# Patient Record
Sex: MALE
[De-identification: names, ages, dates, MRNs, and addresses within clinical notes are randomized per-mention and may not be internally consistent; named-entity substitution may affect disease eponyms.]

---

## 2017-03-19 NOTE — EDM.PDOC
ED HPI GENERAL MEDICAL PROBLEM





- General


Chief Complaint: Gastrointestinal Problem


Stated Complaint: VOMITING


Time Seen by Provider: 03/19/17 16:10





- History of Present Illness


INITIAL COMMENTS - FREE TEXT/NARRATIVE: 





HISTORY AND PHYSICAL:





History of present illness:


The patient is a 31-year-old male with no stated medical problems who presents 

with complaints of nausea vomiting and diarrhea that started at 10 AM. Patient 

states he does not have any abdominal pain fevers chills chest pain or 

shortness of breath and yesterday had a completely normal day. He woke this 

morning at his usual breakfast and went to work when this all started. He has 

not had any recent travel nor has he been any new foods. Bowel movements are 

watery but they're not black or bloody and this is vomiting everything that is 

putting in without coffee grounds or blood. Patient has no history of peptic 

ulcer disease and has not had anything to drink alcohol lives recently. He has 

no gallbladder pancreas or liver disease that he is aware of. The patient tried 

no over-the-counter meds before coming here





Review of systems: 


As per history of present illness and below otherwise all systems reviewed and 

negative.





Past medical history: 


As per history of present illness and as reviewed below otherwise 

noncontributory.





Surgical history: 


As per history of present illness and as reviewed below otherwise 

noncontributory.





Social history: 


No reported history of drug or alcohol abuse.





Family history: 


As per history of present illness and as reviewed below otherwise 

noncontributory.





Physical exam:


General: Well-developed well-nourished thin man who is nontoxic and speaking 

clearly and easily. His vital signs have been noted by me


HEENT: Atraumatic, normocephalic, pupils reactive, negative for conjunctival 

pallor or scleral icterus, mucous membranes tacky, throat clear, neck supple, 

nontender, trachea midline.


Lungs: Clear to auscultation, breath sounds equal bilaterally, chest nontender.


Heart: S1S2, regular, negative for clicks, rubs, or JVD.


Abdomen: Soft, nondistended, nontender. There is no tympany on percussion and 

bowel sounds are hyperactive Negative for masses or hepatosplenomegaly. 

Negative for costovertebral tenderness.


Genitourinary: Deferred.


Rectal: Deferred.


Extremities: Atraumatic, negative for cords or calf pain. Neurovascular 

unremarkable.


Neuro: Awake, alert, oriented. Cranial nerves II through XII unremarkable. 

Cerebellum unremarkable. Motor and sensory unremarkable throughout. Exam 

nonfocal.





Diagnostics:


CBC CMP amylase lipase UA stool for study





Therapeutics:


IV fluids Zofran Bentyl





Patient was unable to produce a stool for sample so I can send it off to the 

lab. He is feeling much better as of my reevaluation and is now taking by mouth 

fluids. I will give him a prescription for Zofran and Bentyl via [facility] for 

him to use and recommend bland diet and push hydration and follow up with 

family Dr.


Impression: 


Vomiting and diarrhea improving





Definitive disposition and diagnosis as appropriate pending reevaluation and 

review of above.





- Related Data


 Allergies











Allergy/AdvReac Type Severity Reaction Status Date / Time


 


azithromycin Allergy  Hives Verified 03/19/17 16:01











Home Meds: 


 Home Meds





. [No Known Home Meds]  04/03/15 [History]











Past Medical History


HEENT History: Reports: Other (see below)


Other HEENT History: strep


Cardiovascular History: Reports: None


Respiratory History: Reports: None


Gastrointestinal History: Reports: None


Genitourinary History: Reports: None


Neurological History: Reports: None


Psychiatric History: Reports: None


Endocrine/Metabolic History: Reports: None


Dermatologic History: Reports: None





- Infectious Disease History


Infectious Disease History: Reports: Chicken pox, Influenza





- Past Surgical History


HEENT Surgical History: Reports: None


Cardiovascular Surgical History: Reports: None


GI Surgical History: Reports: None





Social & Family History





- Tobacco Use


Smoking Status *Q: Current Every Day Smoker


Years of Tobacco use: 14


Packs/Tins Daily: 1





- Recreational Drug Use


Recreational Drug Use: No





ED ROS GENERAL





- Review of Systems


Review Of Systems: ROS reveals no pertinent complaints other than HPI.





ED EXAM, GENERAL





- Physical Exam


Exam: See Below (See dictation)





Course





- Vital Signs


Last Recorded V/S: 


 Last Vital Signs











Temp  36.6 C   03/19/17 16:02


 


Pulse  77   03/19/17 16:02


 


Resp  16   03/19/17 16:02


 


BP  166/93 H  03/19/17 16:02


 


Pulse Ox  98   03/19/17 16:02














- Orders/Labs/Meds


Orders: 


 Active Orders 24 hr











 Category Date Time Status


 


 CULTURE STOOL + CAMPY+SHIGATOX [RM] Stat Lab  03/19/17 16:17 Uncollected


 


 Sodium Chloride 0.9% [Saline Flush] Med  03/19/17 16:17 Active





 10 ml FLUSH ASDIRECTED PRN   


 


 Sodium Chloride 0.9% [Saline Flush] Med  03/19/17 16:17 Active





 2.5 ml FLUSH ASDIRECTED PRN   


 


 Saline Lock Insert [OM.PC] Stat Oth  03/19/17 16:15 Ordered








 Medication Orders





Sodium Chloride (Saline Flush)  10 ml FLUSH ASDIRECTED PRN


   PRN Reason: Keep Vein Open


Sodium Chloride (Saline Flush)  2.5 ml FLUSH ASDIRECTED PRN


   PRN Reason: Keep Vein Open








Labs: 


 Laboratory Tests











  03/19/17 03/19/17 03/19/17 Range/Units





  16:39 16:39 17:15 


 


WBC  11.01 H    (4.0-11.0)  K/uL


 


RBC  5.26    (4.50-5.90)  M/uL


 


Hgb  16.4    (13.0-17.0)  g/dL


 


Hct  47.4    (38.0-50.0)  %


 


MCV  90.1    (80.0-98.0)  fL


 


MCH  31.2    (27.0-32.0)  pg


 


MCHC  34.6    (31.0-37.0)  g/dL


 


RDW Std Deviation  43.5    (28.0-62.0)  fl


 


RDW Coeff of Ben  13    (11.0-15.0)  %


 


Plt Count  217    (150-400)  K/uL


 


MPV  10.00    (7.40-12.00)  fL


 


Neut % (Auto)  59.2    (48.0-80.0)  %


 


Lymph % (Auto)  29.3    (16.0-40.0)  %


 


Mono % (Auto)  7.2    (0.0-15.0)  %


 


Eos % (Auto)  3.5    (0.0-7.0)  %


 


Baso % (Auto)  0.8    (0.0-1.5)  %


 


Neut #  6.5 H    (1.4-5.7)  K/uL


 


Lymph #  3.2 H    (0.6-2.4)  K/uL


 


Mono #  0.8    (0.0-0.8)  K/uL


 


Eos #  0.4    (0.0-0.7)  K/uL


 


Baso #  0.1    (0.0-0.1)  K/uL


 


Nucleated RBC %  0.0    /100WBC


 


Nucleated RBCs #  0    K/uL


 


Sodium   140   (136-146)  mmol/L


 


Potassium   3.7   (3.5-5.1)  mmol/L


 


Chloride   107   ()  mmol/L


 


Carbon Dioxide   23   (21-31)  mmol/L


 


BUN   13   (6.0-23.0)  mg/dL


 


Creatinine   1.3   (0.6-1.5)  mg/dL


 


Est Cr Clr Drug Dosing   94.75   mL/min


 


Estimated GFR (MDRD)   > 60.0   ml/min


 


Glucose   109   ()  mg/dL


 


Calcium   8.7 L   (8.8-10.8)  mg/dL


 


Total Bilirubin   0.7   (0.1-1.5)  mg/dL


 


AST   19   (5-40)  IU/L


 


ALT   17   (8-54)  IU/L


 


Alkaline Phosphatase   67   ()  


 


Total Protein   6.9   (6.0-8.0)  g/dL


 


Albumin   4.2   (3.5-5.0)  g/dL


 


Globulin   2.7   (2.0-3.5)  g/dL


 


Albumin/Globulin Ratio   1.6   (1.3-2.8)  


 


Amylase   73   (10-90)  U/L


 


Lipase   26   (7-80)  U/L


 


Urine Color    YELLOW  


 


Urine Appearance    CLEAR  


 


Urine pH    6.5  (5.0-8.0)  


 


Ur Specific Gravity    1.010  (1.001-1.035)  


 


Urine Protein    NEGATIVE  (NEGATIVE)  mg/dL


 


Urine Glucose (UA)    NEGATIVE  (NEGATIVE)  mg/dL


 


Urine Ketones    NEGATIVE  (NEGATIVE)  mg/dL


 


Urine Occult Blood    NEGATIVE  (NEGATIVE)  


 


Urine Nitrite    NEGATIVE  (NEGATIVE)  


 


Urine Bilirubin    NEGATIVE  (NEGATIVE)  


 


Urine Urobilinogen    0.2  (<2.0)  EU/dL


 


Ur Leukocyte Esterase    NEGATIVE  (NEGATIVE)  


 


Urine RBC    0-1  (0-2/HPF)  


 


Urine WBC    0-2  (0-5/HPF)  


 


Ur Epithelial Cells    RARE  (NONE-FEW)  


 


Urine Bacteria    RARE  (NEGATIVE)  











Meds: 


Medications











Generic Name Dose Route Start Last Admin





  Trade Name Freq  PRN Reason Stop Dose Admin


 


Sodium Chloride  10 ml  03/19/17 16:17  





  Saline Flush  FLUSH   





  ASDIRECTED PRN   





  Keep Vein Open   


 


Sodium Chloride  2.5 ml  03/19/17 16:17  





  Saline Flush  FLUSH   





  ASDIRECTED PRN   





  Keep Vein Open   














Discontinued Medications














Generic Name Dose Route Start Last Admin





  Trade Name Freq  PRN Reason Stop Dose Admin


 


Dicyclomine HCl  10 mg  03/19/17 17:22  





  Bentyl  PO  03/19/17 17:23  





  ONETIME ONE   


 


Sodium Chloride  1,000 mls @ 999 mls/hr  03/19/17 16:17  03/19/17 16:29





  Normal Saline  IV  03/19/17 17:17  999 mls/hr





  STAT ONE   Administration


 


Ondansetron HCl  4 mg  03/19/17 16:17  03/19/17 16:29





  Zofran  IVPUSH  03/19/17 16:18  4 mg





  ONETIME ONE   Administration














Departure





- Departure


Time of Disposition: 17:39


Disposition: Home, Self-Care 01


Condition: good


Clinical Impression: 


 Vomiting, Diarrhea





Forms:  ED Department Discharge


Additional Instructions: 


The following information is given to patients seen in the emergency department 

who are being discharged to home. This information is to outline your options 

for follow-up care. We provide all patients seen in our emergency department 

with a follow-up referral.





The need for follow-up, as well as the timing and circumstances, are variable 

depending upon the specifics of your emergency department visit.





If you don't have a primary care physician on staff, we will provide you with a 

referral. We always advise you to contact your personal physician following an 

emergency department visit to inform them of the circumstance of the visit and 

for follow-up with them and/or the need for any referrals to a consulting 

specialist.





The emergency department will also refer you to a specialist when appropriate. 

This referral assures that you have the opportunity for followup care with a 

specialist. All of these measure are taken in an effort to provide you with 

optimal care, which includes your followup.





Under all circumstances we always encourage you to contact your private 

physician who remains a resource for coordinating  your care. When calling for 

followup care, please make the office aware that this follow-up is from your 

recent emergency room visit. If for any reason you are refused follow-up, 

please contact the [facility] emergency 

department at [phone] and ask to speak to the emergency department 

charge nurse.





[facility] 


Primary care- Internal Medicine and Family [address] [address]


[phone]








Push hydration such as water and Gatorade or juice and avoid caffeinated 

products. He bland diet the next 24 hours and use medications, Zofran and 

dicyclomine, as needed for nausea vomiting and cramping. You can also use over-

the-counter Imodium or other antidiarrheal medications as you choose. Please 

return to ER as needed and as discussed in please call and followup with 

primary care





- My Orders


Last 24 Hours: 


My Active Orders





03/19/17 16:15


Saline Lock Insert [OM.PC] Stat 





03/19/17 16:17


CULTURE STOOL + CAMPY+SHIGATOX [RM] Stat 


Sodium Chloride 0.9% [Saline Flush]   10 ml FLUSH ASDIRECTED PRN 


Sodium Chloride 0.9% [Saline Flush]   2.5 ml FLUSH ASDIRECTED PRN 














- Assessment/Plan


Last 24 Hours: 


My Active Orders





03/19/17 16:15


Saline Lock Insert [OM.PC] Stat 





03/19/17 16:17


CULTURE STOOL + CAMPY+SHIGATOX [RM] Stat 


Sodium Chloride 0.9% [Saline Flush]   10 ml FLUSH ASDIRECTED PRN 


Sodium Chloride 0.9% [Saline Flush]   2.5 ml FLUSH ASDIRECTED PRN

## 2017-06-03 NOTE — EDM.PDOC
ED HPI GENERAL MEDICAL PROBLEM





- General


Chief Complaint: Back Pain or Injury


Stated Complaint: BACK PAIN


Time Seen by Provider: 06/03/17 15:30


Source of Information: Reports: Patient


History Limitations: Reports: No Limitations





- History of Present Illness


INITIAL COMMENTS - FREE TEXT/NARRATIVE: 





Presents to the ER reporting low back pain. The patient does not recall a 

particular injury but a couple of days ago he "twisted wrong" when he got into 

his truck. He thinks that may have initiated the problem. He denies tingling or 

numbness to the buttocks, thighs, lower legs or feet. Denies nausea, hematuria, 

personal or family history of kidney stones, saddle anesthesia, cancer history, 

bowel or bladder dysfunction or recent foreign travel.  He characterizes the 

pain as a tightness across the low back. He states sitting in the truck and 

getting out of bed make the pain worse. He has tried icy hot, ibuprofen, 

Tylenol with out palliation.  He has not had trouble sleeping at night.


  ** Lower Back


Pain Score (Numeric/FACES): 5





- Related Data


 Allergies











Allergy/AdvReac Type Severity Reaction Status Date / Time


 


azithromycin Allergy  Hives Verified 03/19/17 16:01











Home Meds: 


 Home Meds





. [No Known Home Meds]  04/03/15 [History]











Past Medical History





- Past Health History


Medical/Surgical History: Denies Medical/Surgical History


HEENT History: Reports: Other (See Below)


Other HEENT History: strep


Cardiovascular History: Reports: None


Respiratory History: Reports: None


Gastrointestinal History: Reports: None


Genitourinary History: Reports: None


Neurological History: Reports: None


Psychiatric History: Reports: None


Endocrine/Metabolic History: Reports: None


Dermatologic History: Reports: None





- Infectious Disease History


Infectious Disease History: Reports: Chicken Pox, Scarlet Fever





- Past Surgical History


Cardiovascular Surgical History: Reports: None


GI Surgical History: Reports: None





Social & Family History





- Family History


Family Medical History: Noncontributory





- Tobacco Use


Smoking Status *Q: Current Every Day Smoker


Years of Tobacco use: 15


Packs/Tins Daily: 0.3





- Caffeine Use


Caffeine Use: Reports: Energy Drinks, Tea





- Recreational Drug Use


Recreational Drug Use: No





ED ROS GENERAL





- Review of Systems


Review Of Systems: ROS reveals no pertinent complaints other than HPI.





ED EXAM,LOWER BACK PAIN/INJURY





- Physical Exam


Exam: See Below


Exam Limited By: No Limitations


General Appearance: Alert, No Apparent Distress


Ears: Normal External Exam


Nose: Normal Inspection


Throat/Mouth: Normal Inspection


Head: Atraumatic, Normocephalic


Neck: Normal Inspection


Respiratory/Chest: No Respiratory Distress, Lungs Clear, Normal Breath Sounds


Cardiovascular: Normal Peripheral Pulses, Regular Rate, Rhythm, No Murmur


GI/Abdominal: Soft, No Distention


Back Exam: Normal Inspection, Full Range of Motion.  No: CVA Tenderness (L), 

CVA Tenderness (R), Paraspinal Tenderness, Vertebral Tenderness


Extremities: Normal Inspection


Neurological: Alert, Normal Mood/Affect, No Motor/Sensory Deficits, Oriented x 3


Psychiatric: Normal Affect, Normal Mood


Skin Exam: Warm, Dry, Intact, Normal Color, No Rash





Course





- Vital Signs


Last Recorded V/S: 





 Last Vital Signs











Temp  37.1 C   06/03/17 15:37


 


Pulse  75   06/03/17 15:37


 


Resp  16   06/03/17 15:37


 


BP  144/85 H  06/03/17 15:37


 


Pulse Ox  97   06/03/17 15:37














- Orders/Labs/Meds


Orders: 





 Active Orders 24 hr











 Category Date Time Status


 


 Ketorolac [Toradol] Med  06/03/17 15:48 Once





 60 mg IM ONETIME ONE   


 


 Orphenadrine [Norflex] Med  06/03/17 16:00 Once





 60 mg IM Q12H ONE   














Departure





- Departure


Time of Disposition: 15:54


Disposition: Home, Self-Care 01


Condition: good


Clinical Impression: 


Lumbar strain


Qualifiers:


 Encounter type: initial encounter Qualified Code(s): S39.012A - Strain of 

muscle, fascia and tendon of lower back, initial encounter








- Discharge Information


Referrals: 


PCP,None [Primary Care Provider] - 


[facility] [Outside]


[facility] [Outside]


Forms:  ED Department Discharge


Additional Instructions: 


1.  Physical therapy evaluate and treat, see prescription for address and phone 

number to make appointment


2.  Flexeril muscle relaxant every 8 hours as needed, no driving or operating 

machinery


3.  Diclofenac every 8 hours for the next 2-3 days and then as needed for anti-

inflammatory and pain


4.  Hot or cold packs whichever feels best 20 minutes every 3-4 hours











- My Orders


Last 24 Hours: 





My Active Orders





06/03/17 15:48


Ketorolac [Toradol]   60 mg IM ONETIME ONE 





06/03/17 16:00


Orphenadrine [Norflex]   60 mg IM Q12H ONE 














- Assessment/Plan


Last 24 Hours: 





My Active Orders





06/03/17 15:48


Ketorolac [Toradol]   60 mg IM ONETIME ONE 





06/03/17 16:00


Orphenadrine [Norflex]   60 mg IM Q12H ONE

## 2017-08-27 NOTE — EDM.PDOC
ED HPI GENERAL MEDICAL PROBLEM





- General


Chief Complaint: Lower Extremity Injury/Pain


Stated Complaint: CHEM BURNS ON FOOT


Time Seen by Provider: 08/27/17 19:30


Source of Information: Reports: Patient


History Limitations: Reports: No Limitations





- History of Present Illness


INITIAL COMMENTS - FREE TEXT/NARRATIVE: 


HISTORY AND PHYSICAL:





History of present illness:


[Patient comes to the emergency room complaining of a chemical burn to the top 

of his left foot. 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 

found that the pods do not completely disintegrate in the wash and are 

sometimes found mixed in his clothing. He noticed some irritation to the top of 

his left foot in his work boot while he was working a couple of days ago. When 

he got home and took off his sock he noticed a soapy appearance to the top of 

his foot with some scabby lesions. He didn't work for a couple of days and 

returned to work today. His foot feels more irritated today and he has noticed 

redness and swelling across the top of his foot. After work this evening his 

wound was wet and he thinks he saw some greenish discharge from the wound.


He denies fever and chills, no chest pain, shortness of breath or difficulty 

breathing. No abdominal pain nausea vomiting.





Review of systems: 


As per history of present illness and below otherwise all systems reviewed and 

negative.





Past medical history: 


As per history of present illness and as reviewed below otherwise 

noncontributory.





Surgical history: 


As per history of present illness and as reviewed below otherwise 

noncontributory.





Social history: 


No reported history of drug or alcohol abuse.





Family history: 


As per history of present illness and as reviewed below otherwise 

noncontributory.





Physical exam:


HEENT: Atraumatic, normocephalic.


Lungs: Clear to auscultation, breath sounds equal bilaterally.


Heart: S1S2, regular rate and rhythm.


Abdomen: Soft, nondistended, nontender. 


Pelvis: Stable nontender.


Genitourinary: Deferred.


Rectal: Deferred.


Extremities: Quarter-sized burn to top of left mid footwith surrounding 

erythema. No discharge noted. Generalized mild swelling to left foot. No 

tenderness to the plantar aspect. No pitting edema negative for cords or calf 

pain. Neurovascular unremarkable.


Neuro: Awake, alert, oriented. . Motor and sensory unremarkable throughout. 

Exam nonfocal.





Diagnostics:


[cbc]





Therapeutics:


[Keflex 500 mg by mouth 1]





Impression: 


Burn top of left foot[]





Plan:


[Rx for cephalexin 500 mg #30 sig 1 by mouth 3 times a day 0 refills. 

Instructed patient to apply bacitracin and clean bandage twice a day and follow-

up with primary care in the next couple of days. He is in agreement with today'

s plan. All his questions are answered and concerns are addressed.]





Definitive disposition and diagnosis as appropriate pending reevaluation and 

review of above.








  ** left foot


Pain Score (Numeric/FACES): 3





- Related Data


 Allergies











Allergy/AdvReac Type Severity Reaction Status Date / Time


 


azithromycin Allergy  Hives Verified 08/27/17 19:15











Home Meds: 


 Home Meds





. [No Known Home Meds]  08/27/17 [History]











Past Medical History





- Past Health History


Medical/Surgical History: Denies Medical/Surgical History


HEENT History: Reports: Other (See Below)


Other HEENT History: strep


Cardiovascular History: Reports: None


Respiratory History: Reports: None


Gastrointestinal History: Reports: None


Genitourinary History: Reports: None


Neurological History: Reports: None


Psychiatric History: Reports: None


Endocrine/Metabolic History: Reports: None


Dermatologic History: Reports: None





- Infectious Disease History


Infectious Disease History: Reports: Chicken Pox, Scarlet Fever





- Past Surgical History


Cardiovascular Surgical History: Reports: None


GI Surgical History: Reports: None





Social & Family History





- Family History


Family Medical History: Noncontributory





- Tobacco Use


Smoking Status *Q: Current Every Day Smoker


Years of Tobacco use: 14


Packs/Tins Daily: 1





- Caffeine Use


Caffeine Use: Reports: Energy Drinks, Tea





- Recreational Drug Use


Recreational Drug Use: No





Review of Systems





- Review of Systems


Review Of Systems: ROS reveals no pertinent complaints other than HPI.





ED EXAM, GENERAL





- Physical Exam


Exam: See Below





Course





- Vital Signs


Last Recorded V/S: 


 Last Vital Signs











Temp  97.7 F   08/27/17 19:15


 


Pulse  95   08/27/17 19:15


 


Resp  14   08/27/17 19:15


 


BP  141/93 H  08/27/17 19:15


 


Pulse Ox  97   08/27/17 19:15














- Orders/Labs/Meds


Labs: 


 Laboratory Tests











  08/27/17 Range/Units





  19:46 


 


WBC  10.45  (4.0-11.0)  K/uL


 


RBC  5.03  (4.50-5.90)  M/uL


 


Hgb  16.0  (13.0-17.0)  g/dL


 


Hct  45.7  (38.0-50.0)  %


 


MCV  90.9  (80.0-98.0)  fL


 


MCH  31.8  (27.0-32.0)  pg


 


MCHC  35.0  (31.0-37.0)  g/dL


 


RDW Std Deviation  42.7  (28.0-62.0)  fl


 


RDW Coeff of Ben  13  (11.0-15.0)  %


 


Plt Count  219  (150-400)  K/uL


 


MPV  10.10  (7.40-12.00)  fL


 


Neut % (Auto)  55.1  (48.0-80.0)  %


 


Lymph % (Auto)  30.6  (16.0-40.0)  %


 


Mono % (Auto)  7.6  (0.0-15.0)  %


 


Eos % (Auto)  6.1  (0.0-7.0)  %


 


Baso % (Auto)  0.6  (0.0-1.5)  %


 


Neut # (Auto)  5.8 H  (1.4-5.7)  K/uL


 


Lymph # (Auto)  3.2 H  (0.6-2.4)  K/uL


 


Mono # (Auto)  0.8  (0.0-0.8)  K/uL


 


Eos # (Auto)  0.6  (0.0-0.7)  K/uL


 


Baso # (Auto)  0.1  (0.0-0.1)  K/uL


 


Nucleated RBC %  0.0  /100WBC


 


Nucleated RBCs #  0  K/uL











Meds: 


Medications














Discontinued Medications














Generic Name Dose Route Start Last Admin





  Trade Name Freq  PRN Reason Stop Dose Admin


 


Bacitracin  1 dose  08/27/17 20:41  08/27/17 20:48





  Bacitracin Oint 1 Gm  TOP  08/27/17 20:42  1 dose





  ONETIME ONE   Administration


 


Cephalexin  500 mg  08/27/17 20:48  





  Keflex  PO  08/27/17 20:49  





  ONETIME ONE   














Departure





- Departure


Time of Disposition: 20:50


Disposition: Home, Self-Care 01


Condition: Good


Clinical Impression: 


 Burn








- Discharge Information


Referrals: 


PCP,None [Primary Care Provider] - 


Forms:  ED Department Discharge


Additional Instructions: 


The following information is given to patients seen in the emergency department 

who are being discharged to home. This information is to outline your options 

for follow-up care. We provide all patients seen in our emergency department 

with a follow-up referral.





The need for follow-up, as well as the timing and circumstances, are variable 

depending upon the specifics of your emergency department visit.





If you don't have a primary care physician on staff, we will provide you with a 

referral. We always advise you to contact your personal physician following an 

emergency department visit to inform them of the circumstance of the visit and 

for follow-up with them and/or the need for any referrals to a consulting 

specialist.





The emergency department will also refer you to a specialist when appropriate. 

This referral assures that you have the opportunity for follow-up care with a 

specialist. All of these measure are taken in an effort to provide you with 

optimal care, which includes your follow-up.





Under all circumstances we always encourage you to contact your private 

physician who remains a resource for coordinating your care. When calling for 

follow-up care, please make the office aware that this follow-up is from your 

recent emergency room visit. If for any reason you are refused follow-up, 

please contact the [facility]  emergency 

department at [phone] and asked to speak to the emergency department 

charge nurse.








[facility]


Primary Care


[address] [place] [id number]


Phone: [phone]


Fax: [phone]








Follow-up with your primary care provider or the clinic listed above in 48-72 

hours.


Keep burns clean and dry. Apply bacitracin or Neosporin ointment to the 

affected area twice daily and change the dressing.


Tylenol or ibuprofen as needed for discomfort.


Return to ER as needed as discussed.

## 2018-04-02 NOTE — EDM.PDOC
ED HPI GENERAL MEDICAL PROBLEM





- General


Chief Complaint: Abdominal Pain


Stated Complaint: STOMACHE ACHE


Time Seen by Provider: 04/02/18 22:57





- History of Present Illness


INITIAL COMMENTS - FREE TEXT/NARRATIVE: 





HISTORY AND PHYSICAL:





History of present illness:


The patient is a 32-year-old male who presents with a 4-5 day history of 

epigastric pain which is not associated with chest pain shortness of breath 

fever chills vomiting or diarrhea. The patient says that he has had issues with 

his teeth, more specifically a wisdom tooth on the right upper jaw area, for 

which she has been taking a lot of Motrin based products and in the past when 

he has done this he has had stomach pain. He says this feels somewhat 

similarly. The pain is localized in the epigastric area and does not radiate 

right or left and has no lower abdominal pain and no radiation to the back. The 

patient has not had black or bloody stools and he has not taken any over-the-

counter medications for his stomach pain. He says that the pain does seem to be 

better at nighttime and he has not had much of an appetite but he is eating. He 

says foods do not make the pain worse and in fact he thinks it might make it 

better. The patient has only a abdominal surgical history of inguinal hernia 

repair and has not had any other GI problems for which he has seen her provider 

for. I have discussed with the patient his caffeine use and he states he does 

drink 2 Monster drinks and he does smoke cigarettes and does not eat 

specifically a healthy diet.





Review of systems: 


As per history of present illness and below otherwise all systems reviewed and 

negative.





Past medical history: 


As per history of present illness and as reviewed below otherwise 

noncontributory.





Surgical history: 


As per history of present illness and as reviewed below otherwise 

noncontributory.





Social history: 


No reported history of drug or alcohol abuse.





Family history: 


As per history of present illness and as reviewed below otherwise 

noncontributory.





Physical exam:


General: Well-developed well-nourished man is nontoxic and vital signs have 

been reviewed by me


HEENT: Atraumatic, normocephalic, pupils reactive, negative for conjunctival 

pallor or scleral icterus, mucous membranes moist, throat clear, neck supple, 

nontender, trachea midline. On inspection of the dentition the patient has 

multiple areas of dental disease and dental decay but the area that he is most 

concerned about at the right upper wisdom tooth area does have some gum 

erythema without fluctuance and there is no facial swelling.


Lungs: Clear to auscultation, breath sounds equal bilaterally, chest nontender.


Heart: S1S2, regular, and rhythm no overt murmurs


Abdomen: Soft, nondistended, mild epigastric discomfort on deep palpation 

without rebound or guarding, bowel sounds are slightly hypoactive Negative for 

masses or hepatosplenomegaly. Negative for costovertebral tenderness.


Pelvis: Stable nontender.


Genitourinary: Deferred.


Rectal: Deferred.


Extremities: Atraumatic, negative for cords or calf pain. Neurovascular 

unremarkable.


Neuro: Awake, alert, oriented. Cranial nerves II through XII unremarkable. 

Cerebellum unremarkable. Motor and sensory unremarkable throughout. Exam 

nonfocal.





Diagnostics:


CBC CMP H. pylori amylase lipase abdominal x-ray





Therapeutics:


GI cocktail, dental balls for home





I discussed with the patient that we would give him dental balls to try for 

pain management so that he could stop using over-the-counter NSAIDs as this may 

be triggering some of his discomfort. I've also discussed with them reducing and

/or eliminate caffeine use avoiding spicy foods fatty foods and junk foods and 

putting him on a trial of PPIs. If stressed the need for follow-up in the 

clinic as he may need endoscopy to further clarify the etiology of his pain.





Impression: 


Epigastric pain





Definitive disposition and diagnosis as appropriate pending reevaluation and 

review of above.


  ** Gastric area


Pain Score (Numeric/FACES): 4





- Related Data


 Allergies











Allergy/AdvReac Type Severity Reaction Status Date / Time


 


azithromycin Allergy  Hives Verified 04/02/18 22:50











Home Meds: 


 Home Meds





. [No Known Home Meds]  08/27/17 [History]











Past Medical History





- Past Health History


Medical/Surgical History: Denies Medical/Surgical History


HEENT History: Reports: Other (See Below)


Other HEENT History: strep


Cardiovascular History: Reports: None


Respiratory History: Reports: None


Gastrointestinal History: Reports: None


Genitourinary History: Reports: None


Neurological History: Reports: None


Psychiatric History: Reports: None


Endocrine/Metabolic History: Reports: None


Dermatologic History: Reports: None





- Infectious Disease History


Infectious Disease History: Reports: Chicken Pox, Scarlet Fever





- Past Surgical History


Cardiovascular Surgical History: Reports: None


GI Surgical History: Reports: Hernia, Inguinal





Social & Family History





- Family History


Family Medical History: Noncontributory





- Tobacco Use


Smoking Status *Q: Current Every Day Smoker


Years of Tobacco use: 15


Packs/Tins Daily: 0.3





- Caffeine Use


Caffeine Use: Reports: Energy Drinks, Tea





- Recreational Drug Use


Recreational Drug Use: No





ED ROS GENERAL





- Review of Systems


Review Of Systems: ROS reveals no pertinent complaints other than HPI.





ED EXAM, GENERAL





- Physical Exam


Exam: See Below (See dictation)





Course





- Vital Signs


Last Recorded V/S: 


 Last Vital Signs











Temp  37.0 C   04/02/18 22:37


 


Pulse  79   04/02/18 22:37


 


Resp  18   04/02/18 22:37


 


BP  174/104 H  04/02/18 22:37


 


Pulse Ox  96   04/02/18 22:37














- Orders/Labs/Meds


Orders: 


 Active Orders 24 hr











 Category Date Time Status


 


 Abdomen 1V Upright [CR] Stat Exams  04/02/18 22:58 Taken











Labs: 


 Laboratory Tests











  04/02/18 04/02/18 04/02/18 Range/Units





  23:05 23:05 23:05 


 


WBC  13.29 H    (4.0-11.0)  K/uL


 


RBC  5.44    (4.50-5.90)  M/uL


 


Hgb  17.6 H    (13.0-17.0)  g/dL


 


Hct  48.3    (38.0-50.0)  %


 


MCV  88.8    (80.0-98.0)  fL


 


MCH  32.4 H    (27.0-32.0)  pg


 


MCHC  36.4    (31.0-37.0)  g/dL


 


RDW Std Deviation  42.1    (28.0-62.0)  fl


 


RDW Coeff of Ben  13    (11.0-15.0)  %


 


Plt Count  236    (150-400)  K/uL


 


MPV  9.80    (7.40-12.00)  fL


 


Neut % (Auto)  60.7    (48.0-80.0)  %


 


Lymph % (Auto)  28.7    (16.0-40.0)  %


 


Mono % (Auto)  5.9    (0.0-15.0)  %


 


Eos % (Auto)  4.2    (0.0-7.0)  %


 


Baso % (Auto)  0.5    (0.0-1.5)  %


 


Neut # (Auto)  8.1 H    (1.4-5.7)  K/uL


 


Lymph # (Auto)  3.8 H    (0.6-2.4)  K/uL


 


Mono # (Auto)  0.8    (0.0-0.8)  K/uL


 


Eos # (Auto)  0.6    (0.0-0.7)  K/uL


 


Baso # (Auto)  0.1    (0.0-0.1)  K/uL


 


Nucleated RBC %  0.0    /100WBC


 


Nucleated RBCs #  0    K/uL


 


Sodium   137   (136-148)  mmol/L


 


Potassium   3.3 L   (3.5-5.1)  mmol/L


 


Chloride   102   ()  mmol/L


 


Carbon Dioxide   23.9   (21.0-32.0)  mmol/L


 


BUN   11   (7.0-18.0)  mg/dL


 


Creatinine   1.1   (0.8-1.3)  mg/dL


 


Est Cr Clr Drug Dosing   110.97   mL/min


 


Estimated GFR (MDRD)   > 60.0   ml/min


 


Glucose   105   ()  mg/dL


 


Calcium   8.9   (8.5-10.1)  mg/dL


 


Total Bilirubin   0.4   (0.2-1.0)  mg/dL


 


AST   14 L   (15-37)  IU/L


 


ALT   21   (14-63)  IU/L


 


Alkaline Phosphatase   88   ()  U/L


 


Total Protein   7.3   (6.4-8.2)  g/dL


 


Albumin   4.2   (3.4-5.0)  g/dL


 


Globulin   3.1   (2.0-3.5)  g/dL


 


Albumin/Globulin Ratio   1.4   (1.3-2.8)  


 


Amylase   66   ()  U/L


 


Lipase   126   ()  U/L


 


H. pylori IgG Antibody    NEGATIVE  (NEG)  











Meds: 


Medications














Discontinued Medications














Generic Name Dose Route Start Last Admin





  Trade Name Freq  PRN Reason Stop Dose Admin


 


Benzocaine  2 each  04/02/18 22:58  





  Hurricaine One 20%  MUCMEM  04/02/18 22:59  





  ONETIME ONE   





     





     





     





     


 


Lidocaine HCl  15 ml  04/02/18 22:58  





  Xylocaine 2% Viscous  PO  04/02/18 22:59  





  ONETIME ONE   





     





     





     





     














Departure





- Departure


Time of Disposition: 23:43


Disposition: Home, Self-Care 01


Condition: Good


Clinical Impression: 


 Epigastric pain








- Discharge Information


Referrals: 


PCP,None [Primary Care Provider] - 


Forms:  ED Department Discharge


Additional Instructions: 


The following information is given to patients seen in the emergency department 

who are being discharged to home. This information is to outline your options 

for follow-up care. We provide all patients seen in our emergency department 

with a follow-up referral.





The need for follow-up, as well as the timing and circumstances, are variable 

depending upon the specifics of your emergency department visit.





If you don't have a primary care physician on staff, we will provide you with a 

referral. We always advise you to contact your personal physician following an 

emergency department visit to inform them of the circumstance of the visit and 

for follow-up with them and/or the need for any referrals to a consulting 

specialist.





The emergency department will also refer you to a specialist when appropriate. 

This referral assures that you have the opportunity for followup care with a 

specialist. All of these measure are taken in an effort to provide you with 

optimal care, which includes your followup.





Under all circumstances we always encourage you to contact your private 

physician who remains a resource for coordinating  your care. When calling for 

followup care, please make the office aware that this follow-up is from your 

recent emergency room visit. If for any reason you are refused follow-up, 

please contact the [facility] emergency 

department at [phone] and ask to speak to the emergency department 

charge nurse.





[facility] 


Primary care- Internal Medicine and Family [place] [id number]


[phone]








Please take all medications as prescribed , Prevacid, and use the dental balls 

to help with your dental pain. Please stop taking over-the-counter Motrin/

ibuprofen/aspirin based products as this may be irritating her stomach and 

causing him discomfort. Please try to reduce and/or eliminate caffeine use and 

reduce and/or eliminate tobacco use. These call and follow up in our clinic 

with one of our providers for reevaluation and further care return to ER as 

needed and as discussed. Please try to avoid spicy foods fatty foods and junk 

foods. Please try to get follow-up and have your dental issues addressed





- My Orders


Last 24 Hours: 


My Active Orders





04/02/18 22:58


Abdomen 1V Upright [CR] Stat 














- Assessment/Plan


Last 24 Hours: 


My Active Orders





04/02/18 22:58


Abdomen 1V Upright [CR] Stat

## 2018-04-03 NOTE — CR
EXAM DATE: 18



PATIENT'S AGE: 32





Patient: THAI SEPULVEDA



Facility: [place], [place]

Patient ID: [id number]

Site Patient ID: [id number].

Site Accession #: [id number]

: 1985

Study: XRay Abdomen [id number]/2/2018 11:28:36 PM

Ordering Physician: Jacqueline Richards



Final Report: 

INDICATION: ABDOMINAL PAIN FOR 4 DAYS.



TECHNIQUE:

Abdomen 2 view



COMPARISON:

None 



FINDINGS:

Bowel: Nonobstructive bowel gas pattern. 

Soft tissues: No sign of free air. No sign of soft tissue mass. No suspicious 
calcifications. 

Bones: Unremarkable for age. 



IMPRESSION:

Nonobstructive bowel-gas pattern.



Dictated by Mike Parson MD @ 2018 11:31:43 PM





Dictated by: Mike Parson MD @ 2018 23:31:57

(Electronic Signature)



Report Signed by Proxy.
[facility]

## 2019-09-22 NOTE — CR
Indication:



Low back pain. No injury.



Technique:



Three views of the lumbar spine were obtained.



Comparison:



None



Findings:



There is a suggestion of a pars interarticularis defect at L5-S1. Vertebral 

body heights are well maintained. Intervertebral disc space heights are 

well maintained.



Impression:



No fracture



Dictated by Savita Delatorre MD @ Sep 22 2019  1:02PM



Signed by Dr. Savita Delatorre @ Sep 22 2019  1:03PM

## 2019-09-22 NOTE — EDM.PDOC
ED HPI GENERAL MEDICAL PROBLEM





- General


Chief Complaint: Back Pain or Injury


Stated Complaint: LOWER BACK PAIN


Time Seen by Provider: 09/22/19 12:13


Source of Information: Reports: Patient


History Limitations: Reports: No Limitations





- History of Present Illness


INITIAL COMMENTS - FREE TEXT/NARRATIVE: 


HISTORY AND PHYSICAL:





History of present illness:


Patient is a 33-year-old male who presents to the ED today with concern of low 

back pain 3-4 days. 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 

all day which is when he started noticing his low back hurting. Patient states 

he thought he had pulled a muscle so he went to the chiropractor on Friday. 

Patient states since then he's become more stiff of his low back. Patient 

states he took one dose of ibuprofen yesterday but has not taken anything 

today. Patient denies any trauma or injury to his back. Patient denies any loss 

or retention of bowel and bladder function. Patient denies saddle anesthesia. 

Patient denies any health history or any other symptoms or concerns.





Patient denies fever, chills, chest pain, shortness of breath, or cough. Denies 

headache, neck stiff ness, change in vision, syncope, or near syncope. Denies 

nausea, vomiting, abdominal pain, diarrhea, constipation, or dysuria. Has not 

noted any blood in urine or stool. Patient has been eating and drinking 

appropriately.





Review of systems: 


As per history of present illness and below otherwise all systems reviewed and 

negative.





Past medical history: 


As per history of present illness and as reviewed below otherwise 

noncontributory.





Surgical history: 


As per history of present illness and as reviewed below otherwise 

noncontributory.





Social history: 


See social history for further information





Family history: 


As per history of present illness and as reviewed below otherwise 

noncontributory.





Physical exam:


General: Patient is alert, oriented, and in no acute distress. Patient sitting 

comfortably on exam table.


HEENT: Atraumatic, normocephalic, pupils equal and reactive bilaterally, 

negative for conjunctival pallor or scleral icterus, mucous membranes moist, 

TMs normal bilaterally, throat clear, neck supple, nontender, trachea midline. 

No drooling or trismus noted. No meningeal signs. No hot potato voice noted. 


Lungs: Clear to auscultation, breath sounds equal bilaterally, chest nontender.


Heart: S1S2, regular rate and rhythm without overt murmur


Abdomen: Soft, nondistended, nontender. Negative for masses or 

hepatosplenomegaly. Negative for costovertebral tenderness.


Pelvis: Stable nontender.


Genitourinary: Deferred.


Rectal: Deferred.


Skin: Intact, warm, dry. No lesions or rashes noted.


Extremities: Atraumatic, negative for cords or calf pain. Neurovascular 

unremarkable. No obvious deformity of the complete spine. No step-offs, crepitus

, or pain to palpation of the spinous process of complete spine. Patient does 

have mild pain to palpation of the surrounding paraspinous muscles of the 

lumbar spine. Patient does have full range of motion of complete spine but does 

have pain with range of motion of the lumbar spine. Straight leg raise intact 

bilaterally. Patellar reflexes intact bilaterally. Tip toe/heel gait intact.


Neuro: Awake, alert, oriented. Cranial nerves II through XII unremarkable. 

Cerebellum unremarkable. Motor and sensory unremarkable throughout. Exam 

nonfocal.





Notes:


Dr. Cohen verbally involved in patient care.


Discussed the importance for follow-up with a primary care provider.


Voices understanding and is agreeable to plan of care. Denies any further 

questions or concerns at this time.





Diagnostics:


Lumbar x-ray





Therapeutics:


Toradol, Norflex





Prescription:


Diclofenac, Flexeril





Impression: 


Low back pain





Plan:


1. Rest, ice or heat the affected area. You can apply ice and or heat 15 

minutes on, 15 minutes off. 


2. Tylenol as directed for pain management or discomfort. Take medication as 

prescribed


3. Follow up with the primary care provider as discussed. Return to the ED as 

needed and as discussed.





Definitive disposition and diagnosis as appropriate pending reevaluation and 

review of above.





  ** lower back


Pain Score (Numeric/FACES): 9





- Related Data


 Allergies











Allergy/AdvReac Type Severity Reaction Status Date / Time


 


azithromycin Allergy  Hives Verified 09/22/19 12:08











Home Meds: 


 Home Meds





. [No Known Home Meds]  08/27/17 [History]











Past Medical History





- Past Health History


Medical/Surgical History: Denies Medical/Surgical History


HEENT History: Reports: Other (See Below)


Other HEENT History: strep


Cardiovascular History: Reports: None


Respiratory History: Reports: None


Gastrointestinal History: Reports: None


Genitourinary History: Reports: None


Neurological History: Reports: None


Psychiatric History: Reports: None


Endocrine/Metabolic History: Reports: Other (See Below)


Other Endocrine/Metabolic History: Hypoglycemia


Dermatologic History: Reports: None





- Infectious Disease History


Infectious Disease History: Reports: Chicken Pox





- Past Surgical History


HEENT Surgical History: Reports: Oral Surgery


Cardiovascular Surgical History: Reports: None


GI Surgical History: Reports: Hernia, Inguinal





Social & Family History





- Family History


Family Medical History: Noncontributory





- Tobacco Use


Smoking Status *Q: Current Every Day Smoker


Years of Tobacco use: 20


Packs/Tins Daily: 1.5





- Caffeine Use


Caffeine Use: Reports: Energy Drinks





- Recreational Drug Use


Recreational Drug Use: No





ED ROS GENERAL





- Review of Systems


Review Of Systems: ROS reveals no pertinent complaints other than HPI.





ED EXAM, GENERAL





- Physical Exam


Exam: See Below (See dictation)





Course





- Vital Signs


Last Recorded V/S: 


 Last Vital Signs











Temp  97.6 F   09/22/19 12:08


 


Pulse  76   09/22/19 12:08


 


Resp  18   09/22/19 12:08


 


BP  155/87 H  09/22/19 12:08


 


Pulse Ox  98   09/22/19 12:08














- Orders/Labs/Meds


Meds: 


Medications














Discontinued Medications














Generic Name Dose Route Start Last Admin





  Trade Name Freq  PRN Reason Stop Dose Admin


 


Ketorolac Tromethamine  60 mg  09/22/19 12:17  09/22/19 12:24





  Toradol  IM  09/22/19 12:18  60 mg





  ONETIME ONE   Administration





     





     





     





     


 


Orphenadrine Citrate  60 mg  09/22/19 12:18  09/22/19 12:24





  Norflex  IM  09/22/19 12:19  60 mg





  NOW STA   Administration





     





     





     





     














Departure





- Departure


Time of Disposition: 13:13


Disposition: Home, Self-Care 01


Clinical Impression: 


Low back pain


Qualifiers:


 Chronicity: acute Back pain laterality: bilateral Sciatica presence: without 

sciatica Qualified Code(s): M54.5 - Low back pain








- Discharge Information


Referrals: 


PCP,Unknown [Primary Care Provider] - 


Forms:  ED Department Discharge


Additional Instructions: 


The following information is given to patients seen in the emergency department 

who are being discharged to home. This information is to outline your options 

for follow-up care. We provide all patients seen in our emergency department 

with a follow-up referral.





The need for follow-up, as well as the timing and circumstances, are variable 

depending upon the specifics of your emergency department visit.





If you don't have a primary care physician on staff, we will provide you with a 

referral. We always advise you to contact your personal physician following an 

emergency department visit to inform them of the circumstance of the visit and 

for follow-up with them and/or the need for any referrals to a consulting 

specialist.





The emergency department will also refer you to a specialist when appropriate. 

This referral assures that you have the opportunity for follow-up care with a 

specialist. All of these measure are taken in an effort to provide you with 

optimal care, which includes your follow-up.





Under all circumstances we always encourage you to contact your private 

physician who remains a resource for coordinating your care. When calling for 

follow-up care, please make the office aware that this follow-up is from your 

recent emergency room visit. If for any reason you are refused follow-up, 

please contact the [facility] Emergency 

Department at [phone] and asked to speak to the emergency department 

charge nurse.





[facility]


Primary Care


[address] [place] [id number]


Phone: [phone]


Fax: [phone]





[address] [address]


Phone: [phone]


Fax: [phone]





1. Rest, ice or heat the affected area. You can apply ice and or heat 15 

minutes on, 15 minutes off. 


2. Tylenol as directed for pain management or discomfort. Take medication as 

prescribed


3. Follow up with the primary care provider as discussed. Return to the ED as 

needed and as discussed.

## 2021-02-25 ENCOUNTER — HOSPITAL ENCOUNTER (OUTPATIENT)
Dept: HOSPITAL 56 - MW.SDS | Age: 36
End: 2021-02-25
Attending: PAIN MEDICINE
Payer: COMMERCIAL

## 2021-02-25 DIAGNOSIS — M47.26: ICD-10-CM

## 2021-02-25 DIAGNOSIS — M51.16: ICD-10-CM

## 2021-02-25 DIAGNOSIS — Z79.899: ICD-10-CM

## 2021-02-25 DIAGNOSIS — M51.17: ICD-10-CM

## 2021-02-25 DIAGNOSIS — I10: ICD-10-CM

## 2021-02-25 DIAGNOSIS — M43.07: ICD-10-CM

## 2021-02-25 DIAGNOSIS — F17.210: ICD-10-CM

## 2021-02-25 DIAGNOSIS — Z88.1: ICD-10-CM

## 2021-02-25 DIAGNOSIS — G89.29: Primary | ICD-10-CM

## 2021-02-26 NOTE — OR
SURGEON:

Eufemia Sanchez D.O.

 

DATE OF PROCEDURE:  02/25/2021

 

PRIMARY SURGEON:

Eufemia Sanchez D.O.

 

ASSISTANTS:

OR staff present:

1. NATHALIE Tavera RN.

2. BRYN Valdez RN.

3. SARWAT Chahal, RT.

 

PREOPERATIVE DIAGNOSES:

1. L5-S1 herniated disk.

2. Chronic low back pain.

3. Right L5-S1 radiculopathy.

 

POSTOPERATIVE DIAGNOSES:

1. Lumbar L5-S1 herniated disk.

2. Chronic low back pain.

3. Right L5-S1 radiculopathy.

 

PROCEDURES PERFORMED:

1. Right L5 transforaminal epidural steroid injection.

2. Fluoroscopic guidance for needle placement.

3. Local with oral Valium for sedation.

 

SCREENING QUESTIONS:

The patient answered "no" to all of the following questions:

1. Are you allergic to iodine, Betadine or latex?

2. Do you have a bleeding disorder?

3. Do you have any joint replacements, heart valve replacements, or a

    pacemaker?

4. Are you allergic to anti-inflammatories or blood thinners?

5. Do you have any current local or systemic infections?

 

MEDICAL NECESSITY:

This is a patient with a history of chronic low back pain and lower extremity

radicular pain in the above dermatomal pattern that comes in for the above

diagnostic and therapeutic procedure.  Pertinent positives and negatives for

this suspected disease process along with the diagnostic findings and testing

are in the patient's history and physical exam.  The most salient feature

includes radicular pain in the above dermatomal pattern.  The patient had failed

attempts at conservative therapy including physical therapy, nonsteroidal anti-

inflammatory drugs, and other medications.  No contraindications to perform this

procedure including medical, no bleeding disorders or infections, no

psychological, no antisocial personality disorder or active addiction disorder.

There are no work-related issues, and, in general, the patient does not have any

history of multiple prior interventions, surgeries or nerve blocks which have

failed to return the patient to function.

 

The patient's other symptoms to be treated include numbness, paresthesia,

dysesthesia or hypoesthesia referred into the left lower extremity or any

weakness in the involved myotome.  This procedure is being performed in

accordance with national guidelines as written by the International Sp[employer]

[employer]vention Society (ANGELINA).

 

DESCRIPTION OF PROCEDURE:

The patient had the procedure thoroughly explained including risks, benefits and

alternatives.  Consent was signed in my clinic indicating understanding and

willingness to proceed.  The patient presented to [facility] where the patient was escorted to the dressing room to disrobe and change

into a hospital gown.  Preoperative vital signs were taken and stable.  The

patient reported that Valium was taken prior to the procedure.

 

The patient was brought to the procedure room and placed in the prone position

on the table. A pillow was placed under the abdomen in order to flatten the

lumbar lordosis.  The back was prepped with ChloraPrep and sterilely draped.

All personnel in the operating room were dressed in appropriate attire including

surgical scrubs, head and shoe covers.  This was to ensure sterility while in

the treatment room.  During the time fluoroscopy was in use, all personnel in

the operating room wore lead shields with thyroid collars.  Sterile technique

was used during the procedure.

 

The fluoroscope was placed for the right L5 transforaminal epidural steroid

injection.  There was no sign of infection at the skin site for needle

insertion.  The skin was anesthetized with 2% lidocaine with a 27 gauge 1-1/2

inch needle. Then  a 22 gauge 3-1/2 inch spinal needle, advanced to the right

L5.  Under direct fluoroscopic guidance needle position was verified in three

views; AP, oblique and lateral, with 0.2 cubic centimeters increments of Isovue-

200 dye.  No intravascular flow pattern was observed under live fluoroscopy.

 

Then 12 milligrams of Celestone was slowly injected after negative aspiration of

heme, cerebrospinal fluid and no paresthesias were noted.  The needle was

cleared prior to removal from the skin.

 

No adverse reactions were noted.  The patient was brought to the recovery room

awake and in good condition by my staff.  The patient was monitored and

discharge instructions were given after a brief stay in the recovery area.  Both

oral and written discharge and follow up instructions were given.  The patient

will follow up in the clinic in 3-4 weeks post procedure to evaluate the

efficacy.  The patient verbalized understanding including understanding of those

signs and symptoms that would require emergency care and knows how to contact

the office if there are any problems or questions in the meantime.

 

PREOPERATIVE PAIN:

5+/10.

 

POSTOPERATIVE PAIN:

0/10.

 

FOLLOWUP:

In the Pain Clinic in 1 month.

 

 

HOGLCHR / MODL

DD:  02/26/2021 13:33:03

DT:  02/26/2021 19:53:16

Job #:  [id number]

## 2021-04-22 ENCOUNTER — HOSPITAL ENCOUNTER (OUTPATIENT)
Dept: HOSPITAL 56 - MW.SDS | Age: 36
End: 2021-04-22
Attending: PAIN MEDICINE
Payer: COMMERCIAL

## 2021-04-22 DIAGNOSIS — M77.11: ICD-10-CM

## 2021-04-22 DIAGNOSIS — F17.200: ICD-10-CM

## 2021-04-22 DIAGNOSIS — Z88.1: ICD-10-CM

## 2021-04-22 DIAGNOSIS — M47.26: ICD-10-CM

## 2021-04-22 DIAGNOSIS — I10: ICD-10-CM

## 2021-04-22 DIAGNOSIS — M51.16: ICD-10-CM

## 2021-04-22 DIAGNOSIS — Z79.899: ICD-10-CM

## 2021-04-22 DIAGNOSIS — M79.18: ICD-10-CM

## 2021-04-22 DIAGNOSIS — M47.27: ICD-10-CM

## 2021-04-22 DIAGNOSIS — G89.29: Primary | ICD-10-CM

## 2021-04-22 NOTE — OR
SURGEON:

Eufemia Sanchez D.O.

 

DATE OF PROCEDURE:  04/22/2021

 

PRIMARY SURGEON:

Eufemia Sanchez D.O.

 

ASSISTANTS:

OR staff present:

1. WILBER Sevilla RN.

2. NATHALIE Garcia RN.

3. RT Lala.

 

WOUND CLASS:

I.

 

PREOPERATIVE DIAGNOSES:

1. Lumbar degenerative disk disease.

2. Lumbar radiculopathy.

3. Chronic low back pain.

4. Lumbosacral spondylosis.

5. Lumbosacral herniated disk.

 

POSTOPERATIVE DIAGNOSES:

1. Lumbar degenerative disk disease.

2. Lumbar radiculopathy.

3. Chronic low back pain.

4. Lumbosacral spondylosis.

5. Lumbosacral herniated disk.

 

PROCEDURES PERFORMED:

1. Right S1 transforaminal epidural steroid injection.

2. Left S1 transforaminal epidural steroid injection.

3. Fluoroscopic guidance for needle placement.

4. Local with oral Valium for sedation.

 

SCREENING QUESTIONS:

The patient answered "no" to all of the following questions:

1. Are you allergic to iodine, Betadine or latex?

2. Do you have a bleeding disorder?

3. Do you have any joint replacements, heart valve replacements, or a

    pacemaker?

4. Are you allergic to anti-inflammatories or blood thinners?

5. Do you have any current local or systemic infections?

 

DESCRIPTION OF PROCEDURE:

The patient had the procedure thoroughly explained including risks, benefits and

alternatives.  Consent was signed in my clinic indicating understanding and

willingness to proceed.  The patient presented to [facility]

[place] where the patient was escorted to the dressing room to disrobe and change

into a hospital gown.  Preoperative vital signs were taken and stable.  The

patient reported that Valium was taken prior to the procedure.

 

The patient was brought to the procedure room and placed in the prone position

on the table. A pillow was placed under the abdomen in order to flatten the

lumbar lordosis.  The back was prepped with ChloraPrep and sterilely draped.

All personnel in the operating room were dressed in appropriate attire including

surgical scrubs, head and shoe covers.  This was to ensure sterility while in

the treatment room.  During the time fluoroscopy was in use, all personnel in

the operating room wore lead shields with thyroid collars.  Sterile technique

was used during the procedure.

 

The fluoroscope was placed for the right S1 transforaminal epidural

steroid injection.  There was no sign of infection at the skin site for needle

insertion.  The skin was anesthetized with 2% lidocaine with a 27 gauge 1-1/2

inch needle. Then  a 22 gauge 3-1/2 inch spinal needle, advanced to the right

S1 foramen.  Under direct fluoroscopic guidance needle position was verified in

three views; AP, oblique and lateral, with 0.2 cubic centimeters increments of

Isovue-200 dye.  No intravascular flow pattern was observed under live

fluoroscopy.  Then 6 milligrams of Celestone and local  was slowly injected 
after negative aspiration of

heme, cerebrospinal fluid and no paresthesias were noted.  The needle was

cleared prior to removal from the skin.  The procedure was repeated for the left
S1 transforaminal

epidural steroid injection as above.  No adverse reactions were noted.  The 
patient was brought to the recovery room

awake and in good condition by my staff.  The patient was monitored and

discharge instructions were given after a brief stay in the recovery area.  Both

oral and written discharge and follow up instructions were given.  The patient

will follow up in the clinic in 3-4 weeks post procedure to evaluate the

efficacy.  The patient verbalized understanding including understanding of those

signs and symptoms that would require emergency care and knows how to contact

the office if there are any problems or questions in the meantime.

 

PREOPERATIVE PAIN:

5/10.

 

POSTOPERATIVE PAIN:

1/10.

 

PLAN:

Follow up in the pain clinic in 3 weeks.

 

 

CHRIS / GENIA

DD:  04/22/2021 14:31:31

DT:  04/22/2021 20:20:18

Job #:  [id number]

JJ

## 2023-05-08 ENCOUNTER — HOSPITAL ENCOUNTER (EMERGENCY)
Dept: HOSPITAL 56 - MW.ED | Age: 38
LOS: 1 days | Discharge: HOME | End: 2023-05-09
Payer: COMMERCIAL

## 2023-05-08 DIAGNOSIS — E11.9: ICD-10-CM

## 2023-05-08 DIAGNOSIS — K21.9: ICD-10-CM

## 2023-05-08 DIAGNOSIS — Z88.1: ICD-10-CM

## 2023-05-08 DIAGNOSIS — K29.70: Primary | ICD-10-CM

## 2023-05-08 LAB
ALBUMIN SERPL-MCNC: 3.8 G/DL (ref 3.4–5)
ALBUMIN/GLOB SERPL: 1.3 {RATIO} (ref 0.9–1.6)
ALP SERPL-CCNC: 80 U/L (ref 46–116)
ALT SERPL-CCNC: 22 IU/L (ref 14–63)
AST SERPL-CCNC: 11 IU/L (ref 15–37)
BASOPHILS # BLD AUTO: 0.1 K/UL (ref 0–0.1)
BASOPHILS NFR BLD AUTO: 0.8 % (ref 0–1.5)
BILIRUB SERPL-MCNC: 0.6 MG/DL (ref 0.2–1)
BUN SERPL-MCNC: 16 MG/DL (ref 7–18)
CALCIUM SERPL-MCNC: 9 MG/DL (ref 8.5–10.1)
CHLORIDE SERPL-SCNC: 104 MMOL/L (ref 98–107)
CO2 SERPL-SCNC: 28 MMOL/L (ref 21–32)
CREAT CL 24H UR+SERPL-VRATE: 83.23 ML/MIN
CREAT SERPL-MCNC: 1.4 MG/DL (ref 0.8–1.3)
EGFRCR SERPLBLD CKD-EPI 2021: 67 ML/MIN (ref 60–?)
EOSINOPHIL # BLD AUTO: 0.9 K/UL (ref 0–0.7)
EOSINOPHIL NFR BLD AUTO: 7.6 % (ref 0–7)
GLOBULIN SER-MCNC: 3 G/DL (ref 2.6–4)
GLUCOSE SERPL-MCNC: 114 MG/DL (ref 74–106)
HCT VFR BLD AUTO: 42.7 % (ref 38–50)
HGB BLD-MCNC: 15.4 G/DL (ref 13–17)
LIPASE SERPL-CCNC: 106 U/L (ref 73–393)
LYMPHOCYTES # BLD AUTO: 3.7 K/UL (ref 0.6–2.4)
LYMPHOCYTES NFR BLD AUTO: 31.1 % (ref 16–40)
MCH RBC QN AUTO: 31.7 PG (ref 27–32)
MCHC RBC AUTO-ENTMCNC: 36.1 G/DL (ref 31–37)
MCHC RBC AUTO-ENTMCNC: 87.9 FL (ref 80–98)
MONOCYTES # BLD AUTO: 0.8 K/UL (ref 0–0.8)
MONOCYTES NFR BLD AUTO: 7.1 % (ref 0–15)
NEUTROPHILS # BLD AUTO: 6.3 K/UL (ref 1.4–5.7)
NEUTROPHILS NFR BLD AUTO: 53.4 % (ref 48–80)
NRBC BLD AUTO-RTO: 0 /100WBC
NRBC BLD AUTO-RTO: 0 K/UL
PLATELET # BLD AUTO: 271 K/UL (ref 150–400)
PMV BLD AUTO: 9.5 FL (ref 7.4–12)
POTASSIUM SERPL-SCNC: 3.5 MMOL/L (ref 3.5–5.1)
PROT SERPL-MCNC: 6.8 G/DL (ref 6.4–8.2)
RBC # BLD AUTO: 4.86 M/UL (ref 4.5–5.9)
SODIUM SERPL-SCNC: 141 MMOL/L (ref 136–148)
WBC # BLD AUTO: 11.85 K/UL (ref 4–11)

## 2023-05-08 PROCEDURE — 96375 TX/PRO/DX INJ NEW DRUG ADDON: CPT

## 2023-05-08 PROCEDURE — 96361 HYDRATE IV INFUSION ADD-ON: CPT

## 2023-05-08 PROCEDURE — 96374 THER/PROPH/DIAG INJ IV PUSH: CPT

## 2023-05-08 PROCEDURE — 80053 COMPREHEN METABOLIC PANEL: CPT

## 2023-05-08 PROCEDURE — 76705 ECHO EXAM OF ABDOMEN: CPT

## 2023-05-08 PROCEDURE — 85025 COMPLETE CBC W/AUTO DIFF WBC: CPT

## 2023-05-08 PROCEDURE — 36415 COLL VENOUS BLD VENIPUNCTURE: CPT

## 2023-05-08 PROCEDURE — 83690 ASSAY OF LIPASE: CPT

## 2023-05-08 PROCEDURE — 81003 URINALYSIS AUTO W/O SCOPE: CPT

## 2023-05-08 PROCEDURE — 99284 EMERGENCY DEPT VISIT MOD MDM: CPT

## 2023-05-09 VITALS — HEART RATE: 62 BPM | DIASTOLIC BLOOD PRESSURE: 73 MMHG | SYSTOLIC BLOOD PRESSURE: 126 MMHG

## 2023-05-09 LAB
APPEARANCE UR: (no result)
BILIRUB UR STRIP-MCNC: NEGATIVE MG/DL
COLOR UR: YELLOW
GLUCOSE UR STRIP-MCNC: NEGATIVE MG/DL
KETONES UR STRIP-MCNC: NEGATIVE MG/DL
NITRITE UR QL: NEGATIVE
PH UR STRIP: 7 [PH] (ref 5–8)
PROT UR STRIP-MCNC: NEGATIVE MG/DL
RBC UR QL: NEGATIVE
SP GR UR STRIP: 1.01 (ref 1–1.03)
UROBILINOGEN UR STRIP-ACNC: 1 EU/DL (ref ?–2)

## 2023-10-21 ENCOUNTER — HOSPITAL ENCOUNTER (EMERGENCY)
Dept: HOSPITAL 56 - MW.ED | Age: 38
Discharge: HOME | End: 2023-10-21
Payer: COMMERCIAL

## 2023-10-21 VITALS — DIASTOLIC BLOOD PRESSURE: 78 MMHG | HEART RATE: 63 BPM | SYSTOLIC BLOOD PRESSURE: 144 MMHG

## 2023-10-21 DIAGNOSIS — Z79.899: ICD-10-CM

## 2023-10-21 DIAGNOSIS — Z88.1: ICD-10-CM

## 2023-10-21 DIAGNOSIS — M79.622: Primary | ICD-10-CM

## 2023-10-21 PROCEDURE — 73030 X-RAY EXAM OF SHOULDER: CPT

## 2023-10-21 PROCEDURE — 99283 EMERGENCY DEPT VISIT LOW MDM: CPT

## 2023-10-21 PROCEDURE — 96372 THER/PROPH/DIAG INJ SC/IM: CPT
